# Patient Record
Sex: MALE | NOT HISPANIC OR LATINO | URBAN - METROPOLITAN AREA
[De-identification: names, ages, dates, MRNs, and addresses within clinical notes are randomized per-mention and may not be internally consistent; named-entity substitution may affect disease eponyms.]

---

## 2018-04-24 ENCOUNTER — TELEMEDICINE2 (OUTPATIENT)
Dept: CARDIOLOGY | Facility: MEDICAL CENTER | Age: 53
End: 2018-04-24
Payer: COMMERCIAL

## 2018-04-24 VITALS
HEART RATE: 76 BPM | TEMPERATURE: 98.3 F | RESPIRATION RATE: 16 BRPM | WEIGHT: 315 LBS | OXYGEN SATURATION: 92 % | SYSTOLIC BLOOD PRESSURE: 149 MMHG | BODY MASS INDEX: 46.65 KG/M2 | DIASTOLIC BLOOD PRESSURE: 81 MMHG | HEIGHT: 69 IN

## 2018-04-24 DIAGNOSIS — E66.01 MORBID OBESITY (HCC): ICD-10-CM

## 2018-04-24 DIAGNOSIS — R60.0 LOCALIZED EDEMA: ICD-10-CM

## 2018-04-24 DIAGNOSIS — I10 ESSENTIAL HYPERTENSION: ICD-10-CM

## 2018-04-24 DIAGNOSIS — I50.30 (HFPEF) HEART FAILURE WITH PRESERVED EJECTION FRACTION (HCC): ICD-10-CM

## 2018-04-24 PROCEDURE — 99204 OFFICE O/P NEW MOD 45 MIN: CPT | Mod: GT | Performed by: INTERNAL MEDICINE

## 2018-04-24 RX ORDER — AMLODIPINE BESYLATE 5 MG/1
5 TABLET ORAL DAILY
COMMUNITY
End: 2018-10-23

## 2018-04-24 RX ORDER — INSULIN GLARGINE 100 [IU]/ML
65 INJECTION, SOLUTION SUBCUTANEOUS EVERY MORNING
COMMUNITY

## 2018-04-24 RX ORDER — TERAZOSIN 2 MG/1
2 CAPSULE ORAL NIGHTLY
COMMUNITY

## 2018-04-24 RX ORDER — GLIPIZIDE 5 MG/1
5 TABLET ORAL EVERY MORNING
COMMUNITY
End: 2018-10-23

## 2018-04-24 RX ORDER — FUROSEMIDE 40 MG/1
40 TABLET ORAL 2 TIMES DAILY
COMMUNITY

## 2018-04-24 RX ORDER — CARVEDILOL 25 MG/1
25 TABLET ORAL 2 TIMES DAILY WITH MEALS
COMMUNITY

## 2018-04-24 RX ORDER — LOSARTAN POTASSIUM 100 MG/1
100 TABLET ORAL DAILY
COMMUNITY

## 2018-04-24 RX ORDER — ATORVASTATIN CALCIUM 10 MG/1
10 TABLET, FILM COATED ORAL NIGHTLY
COMMUNITY

## 2018-04-24 NOTE — PROGRESS NOTES
Chief Complaint   Patient presents with   • HTN (Uncontrolled)       Subjective:   Lukas Daniel is a 52 y.o. male who presents today for consultation regarding lower extremity edema. He presents via telemedicine from [facility]. He has a history of poorly controlled hypertension previously, diabetes mellitus, morbid obesity, obstructive sleep apnea on C Pap and medication noncompliance. He had an echocardiogram which was a technically difficult study but overall preserved ejection fraction, and a normal renal ultrasound. He has been recommended for spironolactone in the past and is currently not taking it. Laboratory studies reviewed do indicate a relatively high potassium be unlikely to tolerate this at this time. He has no exertional complaints is mildly to moderately active is currently not smoking or drinking or doing drugs as he is incarcerated. Family histories reviewed as below.    Denies any other cardiovascular symptoms including chest pain, shortness of breath, dyspnea on exertion, lightheadedness, syncope or presyncope,PND, orthopnea or palpitations.      Past Medical History:   Diagnosis Date   • (HFpEF) heart failure with preserved ejection fraction (CMS-[facility])    • HTN (hypertension)    • IDDM (insulin dependent diabetes mellitus) (CMS-[facility]ly [facility])    • Localized edema    • Morbid obesity (CMS-[facility]ly [facility])      History reviewed. No pertinent surgical history.  Family History   Problem Relation Age of Onset   • Heart Disease Father      Social History     Social History   • Marital status: Unknown     Spouse name: N/A   • Number of children: N/A   • Years of education: N/A     Occupational History   • Not on file.     Social History Main Topics   • Smoking status: Never Smoker   • Smokeless tobacco: Never Used   • Alcohol use No   • Drug use: No   • Sexual activity: Not on file     Other Topics Concern   • Not on file     Social History Narrative   • No narrative on file     No Known Allergies  Outpatient Encounter  "Prescriptions as of 4/24/2018   Medication Sig Dispense Refill   • losartan (COZAAR) 100 MG Tab Take 100 mg by mouth every day.     • insulin glargine (LANTUS) 100 UNIT/ML Solution Inject 65 Units as instructed every morning.     • amLODIPine (NORVASC) 5 MG Tab Take 5 mg by mouth every day.     • carvedilol (COREG) 25 MG Tab Take 25 mg by mouth 2 times a day, with meals.     • atorvastatin (LIPITOR) 10 MG Tab Take 10 mg by mouth every evening.     • terazosin (HYTRIN) 2 MG Cap Take 2 mg by mouth every evening.     • furosemide (LASIX) 40 MG Tab Take 40 mg by mouth 2 Times a Day.     • glipiZIDE (GLUCOTROL) 5 MG Tab Take 5 mg by mouth every morning.     • aspirin EC (ECOTRIN) 81 MG Tablet Delayed Response Take 81 mg by mouth every day.     • metFORMIN (GLUCOPHAGE) 850 MG Tab Take 850 mg by mouth 3 times a day.       No facility-administered encounter medications on file as of 4/24/2018.      Review of Systems   All other systems reviewed and are negative.       Objective:   /81   Pulse 76   Temp 36.8 °C (98.3 °F)   Resp 16   Ht 1.753 m (5' 9\")   Wt (!) 161.5 kg (356 lb)   SpO2 92%   BMI 52.57 kg/m²     Physical Exam   Constitutional: He is oriented to person, place, and time. He appears well-developed and well-nourished. No distress.   Morbidly obese   HENT:   Head: Normocephalic and atraumatic.   Right Ear: External ear normal.   Left Ear: External ear normal.   Eyes: Conjunctivae and EOM are normal. Pupils are equal, round, and reactive to light. Right eye exhibits no discharge. Left eye exhibits no discharge. No scleral icterus.   Neck: Normal range of motion. Neck supple. No JVD present. No tracheal deviation present. No thyromegaly present.   Cardiovascular: Normal rate, regular rhythm and intact distal pulses.  PMI is not displaced.  Exam reveals no gallop and no friction rub.    No murmur heard.  Pulses:       Carotid pulses are 2+ on the right side, and 2+ on the left side.       Radial pulses " are 2+ on the left side.        Popliteal pulses are 2+ on the right side, and 2+ on the left side.        Dorsalis pedis pulses are 2+ on the right side, and 2+ on the left side.        Posterior tibial pulses are 2+ on the right side, and 2+ on the left side.   Pulmonary/Chest: Effort normal and breath sounds normal. No respiratory distress. He has no wheezes. He has no rales. He exhibits no tenderness.   Abdominal: Soft. Bowel sounds are normal. He exhibits no distension. There is no tenderness.   Musculoskeletal: Normal range of motion. He exhibits edema (3+ bilateral lower extremity edema). He exhibits no tenderness or deformity.   Neurological: He is alert and oriented to person, place, and time. No cranial nerve deficit (cranial nerves II through XII grossly intact). Coordination normal.   Skin: Skin is warm and dry. No rash noted. He is not diaphoretic. No erythema. No pallor.   Psychiatric: He has a normal mood and affect. His behavior is normal. Thought content normal.   Vitals reviewed.    LABORATORY STUDIES (3/14/2018): , HDL 38, LDL 69, TG 89, creatinine 0.72, potassium 4.3. Remainder of the metabolic panel is unremarkable aside from his blood sugars.    ECHO CONCLUSIONS (3/26/2018):  Versus normal left ventricular function, LVEF 55%. No gross valvular abnormalities aside from mild aortic insufficiency.    RENAL ULTRASOUND (3/26/2018):  Normal bilateral renal arterial ultrasound    Assessment:     1. Essential hypertension     2. Morbid obesity (CMS-HCC)     3. Localized edema     4. IDDM (insulin dependent diabetes mellitus) (CMS-HCC)     5. (HFpEF) heart failure with preserved ejection fraction (CMS-HCC)         Medical Decision Making:  Today's Assessment / Status / Plan:     He has multifactorial lower extremity edema and hypertension. This is likely due to a combination of his morbid obesity, venous incompetence, amlodipine is, a component of diastolic heart failure, and sleep apnea. It  appears his blood pressure seemes to be coming under better control but he does continue to have significant lower extremity edema. Recommend the followin. Compression stockings bilaterally to mid thigh  2. Increase Lasix from 40 mg twice a day to 40 mg 3 times a day with final up titration of 80 mg twice a day if no appropriate response and edema.  3. I do not think he will tolerate spironolactone as previously suggested at this time. Should potassium remained stable however he may tolerate it with an increased dose of Lasix in the future.  4. Recommend continued efforts at weight loss and dietary compliance as well as increased physical activity.  5. Continue other medical therapy as above.  6. Continue sleep apnea therapy    Follow-up in 6 months    This consultation was conducted utilizing secure and encrypted videoconferencing equipment with the assistance of a trained tele-presenter at the originating site.

## 2018-10-22 ENCOUNTER — HOSPITAL ENCOUNTER (OUTPATIENT)
Dept: RADIOLOGY | Facility: MEDICAL CENTER | Age: 53
End: 2018-10-22

## 2018-10-23 ENCOUNTER — TELEMEDICINE2 (OUTPATIENT)
Dept: CARDIOLOGY | Facility: MEDICAL CENTER | Age: 53
End: 2018-10-23
Payer: COMMERCIAL

## 2018-10-23 VITALS
DIASTOLIC BLOOD PRESSURE: 88 MMHG | HEART RATE: 72 BPM | WEIGHT: 315 LBS | BODY MASS INDEX: 46.65 KG/M2 | OXYGEN SATURATION: 96 % | HEIGHT: 69 IN | SYSTOLIC BLOOD PRESSURE: 164 MMHG

## 2018-10-23 DIAGNOSIS — I11.0 HYPERTENSIVE HEART DISEASE WITH CHRONIC DIASTOLIC CONGESTIVE HEART FAILURE (HCC): ICD-10-CM

## 2018-10-23 DIAGNOSIS — I50.32 HYPERTENSIVE HEART DISEASE WITH CHRONIC DIASTOLIC CONGESTIVE HEART FAILURE (HCC): ICD-10-CM

## 2018-10-23 DIAGNOSIS — I50.30 (HFPEF) HEART FAILURE WITH PRESERVED EJECTION FRACTION (HCC): Primary | ICD-10-CM

## 2018-10-23 PROCEDURE — 99214 OFFICE O/P EST MOD 30 MIN: CPT | Mod: GT | Performed by: INTERNAL MEDICINE

## 2018-10-23 RX ORDER — HYDROCHLOROTHIAZIDE 25 MG/1
25 TABLET ORAL DAILY
Qty: 30 TAB | Refills: 11 | Status: SHIPPED | OUTPATIENT
Start: 2018-10-23

## 2018-10-23 RX ORDER — HYDRALAZINE HYDROCHLORIDE 100 MG/1
100 TABLET, FILM COATED ORAL 3 TIMES DAILY
Qty: 180 TAB | Refills: 6 | Status: SHIPPED | OUTPATIENT
Start: 2018-10-23

## 2018-10-23 RX ORDER — FUROSEMIDE 40 MG/1
40 TABLET ORAL DAILY
COMMUNITY

## 2018-10-23 RX ORDER — CYANOCOBALAMIN 1000 UG/ML
1000 INJECTION, SOLUTION INTRAMUSCULAR; SUBCUTANEOUS
COMMUNITY

## 2018-10-23 RX ORDER — FUROSEMIDE 20 MG/1
60 TABLET ORAL 3 TIMES DAILY
COMMUNITY

## 2018-10-23 RX ORDER — THERMOMETER, ELECTRONIC,ORAL
EACH MISCELLANEOUS 2 TIMES DAILY
COMMUNITY

## 2018-10-23 RX ORDER — ENALAPRIL MALEATE 20 MG/1
40 TABLET ORAL DAILY
COMMUNITY
End: 2018-10-23

## 2018-10-23 RX ORDER — ENALAPRIL MALEATE 20 MG/1
40 TABLET ORAL DAILY
Qty: 30 TAB | Status: SHIPPED | DISCHARGE
Start: 2018-10-23

## 2018-10-23 RX ORDER — POTASSIUM CHLORIDE 1.5 G/1.58G
20 POWDER, FOR SOLUTION ORAL 2 TIMES DAILY
COMMUNITY

## 2018-10-23 RX ORDER — GABAPENTIN 300 MG/1
300 CAPSULE ORAL 3 TIMES DAILY
COMMUNITY

## 2018-10-23 RX ORDER — ACETAMINOPHEN 650 MG/1
650 SUPPOSITORY RECTAL EVERY 4 HOURS PRN
COMMUNITY

## 2018-10-23 NOTE — LETTER
[facility]   [address] [place] [address]  [place] [id number]  Phone: [phone]  Fax: [phone]              Lukas Daniel  1965    Encounter Date: 10/23/2018    Rony Hauser M.D.          PROGRESS NOTE:  No chief complaint on file.      Subjective:   Lukas Daniel is a 52 -year-old man with history of previously poorly controlled blood pressure, type 2 diabetes, morbid obesity, and sleep apnea on CPAP who we have been following for heart failure with preserved ejection fraction.    He is doing overall well today with mild persistent lower extremity edema after having changed to what appears to have been 80 mg p.o. twice daily of Lasix then 60 mg p.o. 3 times daily, which is the dose that he is presently on.    He has no cardiovascular complaints with the exception of his mild persistent lower extremity edema.  He specifically tells me that he works out in the gym sporadically playing basketball on exercise bike and with resistance exercises with no limitation from the standpoint of exertional dyspnea.  Again, cardiovascular review systems is otherwise negative.    The patient is not exactly sure about the doses of his multiple medications, which have also changed since the time of our last visit.    Past Medical History:   Diagnosis Date   • (HFpEF) heart failure with preserved ejection fraction (HCC)    • HTN (hypertension)    • IDDM (insulin dependent diabetes mellitus) (HCC)    • Localized edema    • Morbid obesity (HCC)      History reviewed. No pertinent surgical history.  Family History   Problem Relation Age of Onset   • Heart Disease Father      Social History     Social History   • Marital status: Unknown     Spouse name: N/A   • Number of children: N/A   • Years of education: N/A     Occupational History   • Not on file.     Social History Main Topics   • Smoking status: Never Smoker   • Smokeless tobacco: Never Used   • Alcohol use No   • Drug use: No      • Sexual activity: Not on file     Other Topics Concern   • Not on file     Social History Narrative   • No narrative on file     No Known Allergies  Outpatient Encounter Prescriptions as of 10/23/2018   Medication Sig Dispense Refill   • metformin (GLUCOPHAGE) 1000 MG tablet Take 1,000 mg by mouth 2 times a day, with meals.     • potassium chloride (KLOR-CON) 20 MEQ Pack Take 20 mEq by mouth 2 times a day.     • cyanocobalamin (VITAMIN B-12) 1000 MCG/ML Solution 1,000 mcg by Intramuscular route every 30 days.     • furosemide (LASIX) 40 MG Tab Take 40 mg by mouth every day.     • gabapentin (NEURONTIN) 300 MG Cap Take 300 mg by mouth 3 times a day.     • enalapril (VASOTEC) 20 MG tablet Take 40 mg by mouth every day.     • acetaminophen (TYLENOL) 650 MG Suppos Insert 650 mg in rectum every four hours as needed.     • tolnaftate (TINACTIN) 1 % Cream Apply  to affected area(s) 2 times a day.     • hydroCHLOROthiazide (HYDRODIURIL) 25 MG Tab Take 1 Tab by mouth every day. 30 Tab 11   • hydrALAZINE (APRESOLINE) 100 MG tablet Take 1 Tab by mouth 3 times a day. 180 Tab 6   • losartan (COZAAR) 100 MG Tab Take 100 mg by mouth every day.     • insulin glargine (LANTUS) 100 UNIT/ML Solution Inject 65 Units as instructed every morning.     • carvedilol (COREG) 25 MG Tab Take 25 mg by mouth 2 times a day, with meals.     • atorvastatin (LIPITOR) 10 MG Tab Take 10 mg by mouth every evening.     • terazosin (HYTRIN) 2 MG Cap Take 2 mg by mouth every evening.     • aspirin EC (ECOTRIN) 81 MG Tablet Delayed Response Take 81 mg by mouth every day.     • furosemide (LASIX) 40 MG Tab Take 40 mg by mouth 2 Times a Day.     • [DISCONTINUED] amLODIPine (NORVASC) 5 MG Tab Take 5 mg by mouth every day.     • [DISCONTINUED] glipiZIDE (GLUCOTROL) 5 MG Tab Take 5 mg by mouth every morning.     • [DISCONTINUED] metFORMIN (GLUCOPHAGE) 850 MG Tab Take 850 mg by mouth 3 times a day.       No facility-administered encounter medications on file  "as of 10/23/2018.      Review of Systems   Cardiovascular: Positive for leg swelling.   Musculoskeletal: Positive for joint pain.   All other systems reviewed and are negative.       Objective:   BP (!) 164/88 (BP Location: Left arm, Patient Position: Sitting, BP Cuff Size: Adult)   Pulse 72   Ht 1.753 m (5' 9\")   Wt (!) 174.2 kg (384 lb)   SpO2 96%   BMI 56.71 kg/m²      Physical Exam   Constitutional: He is oriented to person, place, and time. He appears well-developed and well-nourished. No distress.   Pleasant obese and made in no distress   HENT:   Head: Normocephalic and atraumatic.   Eyes: Pupils are equal, round, and reactive to light. Conjunctivae and EOM are normal. No scleral icterus.   Neck: Neck supple. No thyromegaly present.   Cardiovascular: Normal rate, regular rhythm and normal heart sounds.    No murmur heard.  Pulses:       Dorsalis pedis pulses are 2+ on the right side, and 2+ on the left side.   Pulmonary/Chest: Effort normal. No respiratory distress. He has no wheezes. He has no rales.   Abdominal: Soft. Bowel sounds are normal. He exhibits no distension.   Musculoskeletal: He exhibits edema (1+ bilateral pretibial edema up to his tibial tuberosity despite compression garments).   Neurological: He is alert and oriented to person, place, and time.   Skin: He is not diaphoretic. No erythema. No pallor.   Psychiatric: He has a normal mood and affect. Judgment and thought content normal.   Vitals reviewed.    Echocardiogram, 3/26/2018: Demonstrated normal left ventricular ejection fraction of 55% with no valvular abnormalities apart from mild aortic insufficiency    EKG, 8/9/2018, tracings and report reviewed, my interpretation: Sinus rhythm, rate 87 bpm, poor R wave progression, no other chamber enlargement or ischemic changes noted, borderline intraventricular conduction delay    Labs, 8/23/2018  Lipids: LDL 35, HDL 35, triglycerides 82, total cholesterol 86  Chemistry panel: Sodium 140, " potassium 4.3, chloride 97, CO2 24, BUN 12, creatinine 0.61, glucose 248, calcium 8.5  LFTs: AST 12, ALT 12, alkaline phosphatase 88, albumin 3.6, total protein 7.0, total bilirubin 0.8  CBC: WBC 6.1, hemoglobin 14.7, platelets 280     Assessment:     1. (HFpEF) heart failure with preserved ejection fraction (HCC)  hydroCHLOROthiazide (HYDRODIURIL) 25 MG Tab    hydrALAZINE (APRESOLINE) 100 MG tablet    BASIC METABOLIC PANEL    MAGNESIUM   2. Hypertensive heart disease with chronic diastolic congestive heart failure (HCC)         Medical Decision Making:  Today's Assessment / Status / Plan:     He still is mildly volume overloaded with his heart failure with preserved ejection fraction secondary to hypertensive heart disease.  His blood pressure also is inadequately controlled at 164/88 mmHg.  From my review of his records, his antihypertensive regimen presently consists of Coreg 25 mg p.o. twice daily, hydralazine 75 mg p.o. 3 times daily, losartan 100 mg p.o. daily, and enalapril 40 mg p.o. daily.  I generally do not favor the combination of ACE inhibitor and ARB though I have not changed that for now.    At this time, I increased his hydralazine to 100 mg p.o. 3 times daily, and added hydralazine for sequential nephron inhibition and better control of his hypertension.  I did order a nonfasting chemistry panel and magnesium to be drawn 2 weeks after those medication changes.  I encouraged ongoing physical activity, and discussed again fluid restrictions.    Rony Hauser MD  Cardiologist, [facility] [place]     Return in about 3 months (around 1/23/2019).        No Recipients

## 2018-10-23 NOTE — PROGRESS NOTES
No chief complaint on file.      Subjective:   Lukas Daniel is a 52 -year-old man with history of previously poorly controlled blood pressure, type 2 diabetes, morbid obesity, and sleep apnea on CPAP who we have been following for heart failure with preserved ejection fraction.    He is doing overall well today with mild persistent lower extremity edema after having changed to what appears to have been 80 mg p.o. twice daily of Lasix then 60 mg p.o. 3 times daily, which is the dose that he is presently on.    He has no cardiovascular complaints with the exception of his mild persistent lower extremity edema.  He specifically tells me that he works out in the gym sporadically playing basketball on exercise bike and with resistance exercises with no limitation from the standpoint of exertional dyspnea.  Again, cardiovascular review systems is otherwise negative.    The patient is not exactly sure about the doses of his multiple medications, which have also changed since the time of our last visit.    Past Medical History:   Diagnosis Date   • (HFpEF) heart failure with preserved ejection fraction (HCC)    • HTN (hypertension)    • IDDM (insulin dependent diabetes mellitus) (HCC)    • Localized edema    • Morbid obesity (HCC)      History reviewed. No pertinent surgical history.  Family History   Problem Relation Age of Onset   • Heart Disease Father      Social History     Social History   • Marital status: Unknown     Spouse name: N/A   • Number of children: N/A   • Years of education: N/A     Occupational History   • Not on file.     Social History Main Topics   • Smoking status: Never Smoker   • Smokeless tobacco: Never Used   • Alcohol use No   • Drug use: No   • Sexual activity: Not on file     Other Topics Concern   • Not on file     Social History Narrative   • No narrative on file     No Known Allergies  Outpatient Encounter Prescriptions as of 10/23/2018   Medication Sig Dispense Refill   • metformin  (GLUCOPHAGE) 1000 MG tablet Take 1,000 mg by mouth 2 times a day, with meals.     • potassium chloride (KLOR-CON) 20 MEQ Pack Take 20 mEq by mouth 2 times a day.     • cyanocobalamin (VITAMIN B-12) 1000 MCG/ML Solution 1,000 mcg by Intramuscular route every 30 days.     • furosemide (LASIX) 40 MG Tab Take 40 mg by mouth every day.     • gabapentin (NEURONTIN) 300 MG Cap Take 300 mg by mouth 3 times a day.     • enalapril (VASOTEC) 20 MG tablet Take 40 mg by mouth every day.     • acetaminophen (TYLENOL) 650 MG Suppos Insert 650 mg in rectum every four hours as needed.     • tolnaftate (TINACTIN) 1 % Cream Apply  to affected area(s) 2 times a day.     • hydroCHLOROthiazide (HYDRODIURIL) 25 MG Tab Take 1 Tab by mouth every day. 30 Tab 11   • hydrALAZINE (APRESOLINE) 100 MG tablet Take 1 Tab by mouth 3 times a day. 180 Tab 6   • losartan (COZAAR) 100 MG Tab Take 100 mg by mouth every day.     • insulin glargine (LANTUS) 100 UNIT/ML Solution Inject 65 Units as instructed every morning.     • carvedilol (COREG) 25 MG Tab Take 25 mg by mouth 2 times a day, with meals.     • atorvastatin (LIPITOR) 10 MG Tab Take 10 mg by mouth every evening.     • terazosin (HYTRIN) 2 MG Cap Take 2 mg by mouth every evening.     • aspirin EC (ECOTRIN) 81 MG Tablet Delayed Response Take 81 mg by mouth every day.     • furosemide (LASIX) 40 MG Tab Take 40 mg by mouth 2 Times a Day.     • [DISCONTINUED] amLODIPine (NORVASC) 5 MG Tab Take 5 mg by mouth every day.     • [DISCONTINUED] glipiZIDE (GLUCOTROL) 5 MG Tab Take 5 mg by mouth every morning.     • [DISCONTINUED] metFORMIN (GLUCOPHAGE) 850 MG Tab Take 850 mg by mouth 3 times a day.       No facility-administered encounter medications on file as of 10/23/2018.      Review of Systems   Cardiovascular: Positive for leg swelling.   Musculoskeletal: Positive for joint pain.   All other systems reviewed and are negative.       Objective:   BP (!) 164/88 (BP Location: Left arm, Patient Position:  "Sitting, BP Cuff Size: Adult)   Pulse 72   Ht 1.753 m (5' 9\")   Wt (!) 174.2 kg (384 lb)   SpO2 96%   BMI 56.71 kg/m²      Physical Exam   Constitutional: He is oriented to person, place, and time. He appears well-developed and well-nourished. No distress.   Pleasant obese and made in no distress   HENT:   Head: Normocephalic and atraumatic.   Eyes: Pupils are equal, round, and reactive to light. Conjunctivae and EOM are normal. No scleral icterus.   Neck: Neck supple. No thyromegaly present.   Cardiovascular: Normal rate, regular rhythm and normal heart sounds.    No murmur heard.  Pulses:       Dorsalis pedis pulses are 2+ on the right side, and 2+ on the left side.   Pulmonary/Chest: Effort normal. No respiratory distress. He has no wheezes. He has no rales.   Abdominal: Soft. Bowel sounds are normal. He exhibits no distension.   Musculoskeletal: He exhibits edema (1+ bilateral pretibial edema up to his tibial tuberosity despite compression garments).   Neurological: He is alert and oriented to person, place, and time.   Skin: He is not diaphoretic. No erythema. No pallor.   Psychiatric: He has a normal mood and affect. Judgment and thought content normal.   Vitals reviewed.    Echocardiogram, 3/26/2018: Demonstrated normal left ventricular ejection fraction of 55% with no valvular abnormalities apart from mild aortic insufficiency    EKG, 8/9/2018, tracings and report reviewed, my interpretation: Sinus rhythm, rate 87 bpm, poor R wave progression, no other chamber enlargement or ischemic changes noted, borderline intraventricular conduction delay    Labs, 8/23/2018  Lipids: LDL 35, HDL 35, triglycerides 82, total cholesterol 86  Chemistry panel: Sodium 140, potassium 4.3, chloride 97, CO2 24, BUN 12, creatinine 0.61, glucose 248, calcium 8.5  LFTs: AST 12, ALT 12, alkaline phosphatase 88, albumin 3.6, total protein 7.0, total bilirubin 0.8  CBC: WBC 6.1, hemoglobin 14.7, platelets 280     Assessment:     1. " (HFpEF) heart failure with preserved ejection fraction (HCC)  hydroCHLOROthiazide (HYDRODIURIL) 25 MG Tab    hydrALAZINE (APRESOLINE) 100 MG tablet    BASIC METABOLIC PANEL    MAGNESIUM   2. Hypertensive heart disease with chronic diastolic congestive heart failure (HCC)         Medical Decision Making:  Today's Assessment / Status / Plan:     He still is mildly volume overloaded with his heart failure with preserved ejection fraction secondary to hypertensive heart disease.  His blood pressure also is inadequately controlled at 164/88 mmHg.  From my review of his records, his antihypertensive regimen presently consists of Coreg 25 mg p.o. twice daily, hydralazine 75 mg p.o. 3 times daily, losartan 100 mg p.o. daily, and enalapril 40 mg p.o. daily.  I generally do not favor the combination of ACE inhibitor and ARB though I have not changed that for now.    At this time, I increased his hydralazine to 100 mg p.o. 3 times daily, and added hydralazine for sequential nephron inhibition and better control of his hypertension.  I did order a nonfasting chemistry panel and magnesium to be drawn 2 weeks after those medication changes.  I encouraged ongoing physical activity, and discussed again fluid restrictions.    Rony Hauser MD  Cardiologist, [facility] [place]     Return in about 3 months (around 1/23/2019).

## 2018-10-30 ENCOUNTER — HOSPITAL ENCOUNTER (OUTPATIENT)
Dept: RADIOLOGY | Facility: MEDICAL CENTER | Age: 53
End: 2018-10-30

## 2018-12-04 ENCOUNTER — TELEPHONE (OUTPATIENT)
Dept: CARDIOLOGY | Facility: MEDICAL CENTER | Age: 53
End: 2018-12-04

## 2018-12-04 NOTE — TELEPHONE ENCOUNTER
Discussed w/ Dr Hauser, please have the pt's correctional office in charge of this contact him directly. Email sent to please have the correctional in charge of this pt contact our office to discussed.

## 2019-10-04 ENCOUNTER — HOSPITAL ENCOUNTER (OUTPATIENT)
Dept: RADIOLOGY | Facility: MEDICAL CENTER | Age: 54
End: 2019-10-04

## 2019-10-09 ENCOUNTER — HOSPITAL ENCOUNTER (OUTPATIENT)
Dept: RADIOLOGY | Facility: MEDICAL CENTER | Age: 54
End: 2019-10-09